# Patient Record
Sex: MALE | Race: WHITE
[De-identification: names, ages, dates, MRNs, and addresses within clinical notes are randomized per-mention and may not be internally consistent; named-entity substitution may affect disease eponyms.]

---

## 2017-01-04 ENCOUNTER — HOSPITAL ENCOUNTER (OUTPATIENT)
Dept: HOSPITAL 80 - BHFA | Age: 72
End: 2017-01-04
Attending: INTERNAL MEDICINE
Payer: COMMERCIAL

## 2017-01-04 DIAGNOSIS — I71.9: Primary | ICD-10-CM

## 2017-06-27 ENCOUNTER — HOSPITAL ENCOUNTER (OUTPATIENT)
Dept: HOSPITAL 80 - FED | Age: 72
Setting detail: OBSERVATION
LOS: 2 days | Discharge: HOME | End: 2017-06-29
Attending: SURGERY | Admitting: SURGERY
Payer: COMMERCIAL

## 2017-06-27 DIAGNOSIS — K21.9: ICD-10-CM

## 2017-06-27 DIAGNOSIS — I48.91: ICD-10-CM

## 2017-06-27 DIAGNOSIS — I77.810: ICD-10-CM

## 2017-06-27 DIAGNOSIS — Z96.653: ICD-10-CM

## 2017-06-27 DIAGNOSIS — K56.60: Primary | ICD-10-CM

## 2017-06-27 DIAGNOSIS — K57.32: ICD-10-CM

## 2017-06-27 DIAGNOSIS — Z96.641: ICD-10-CM

## 2017-06-27 LAB
% IMMATURE GRANULYOCYTES: 0.3 % (ref 0–1.1)
ABSOLUTE IMMATURE GRANULOCYTES: 0.03 10^3/UL (ref 0–0.1)
ABSOLUTE NRBC COUNT: 0 10^3/UL (ref 0–0.01)
ADD DIFF?: NO
ADD MORPH?: NO
ADD SCAN?: NO
APTT BLD: 28.4 SEC (ref 23–38)
ATYPICAL LYMPHOCYTE FLAG: 0 (ref 0–99)
ERYTHROCYTE [DISTWIDTH] IN BLOOD BY AUTOMATED COUNT: 11.9 % (ref 11.5–15.2)
FRAGMENT RBC FLAG: 0 (ref 0–99)
HCT VFR BLD CALC: 46.7 % (ref 40–51)
HGB BLD-MCNC: 17.2 G/DL (ref 13.7–17.5)
INR PPP: 1.17 (ref 0.83–1.16)
LEFT SHIFT FLG: 0 (ref 0–99)
LIPEMIA HEMOLYSIS FLAG: 90 (ref 0–99)
MCH RBC BLDCO QN: 33.3 PG (ref 27.9–34.1)
MCHC RBC AUTO-ENTMCNC: 36.8 G/DL (ref 32.4–36.7)
MCV RBC AUTO: 90.3 FL (ref 81.5–99.8)
NRBC-AUTO%: 0 % (ref 0–0.2)
PLATELET # BLD: 219 10^3/UL (ref 150–400)
PLATELET CLUMPS FLAG: 10 (ref 0–99)
PMV BLD AUTO: 10.5 FL (ref 8.7–11.7)
PROTHROMBIN TIME: 14.9 SEC (ref 12–15)
RBC # BLD AUTO: 5.17 10^6/UL (ref 4.4–6.38)

## 2017-06-27 PROCEDURE — 93005 ELECTROCARDIOGRAM TRACING: CPT

## 2017-06-27 PROCEDURE — 0DN84ZZ RELEASE SMALL INTESTINE, PERCUTANEOUS ENDOSCOPIC APPROACH: ICD-10-PCS | Performed by: SURGERY

## 2017-06-27 PROCEDURE — 74177 CT ABD & PELVIS W/CONTRAST: CPT

## 2017-06-27 PROCEDURE — G0378 HOSPITAL OBSERVATION PER HR: HCPCS

## 2017-06-27 PROCEDURE — 71010: CPT

## 2017-06-27 PROCEDURE — 44180 LAP ENTEROLYSIS: CPT

## 2017-06-27 NOTE — CPEKG
Heart Rate: 69

RR Interval: 870

P-R Interval: 176

QRSD Interval: 96

QT Interval: 420

QTC Interval: 450

P Axis: 45

QRS Axis: -18

T Wave Axis: 37

EKG Severity - OTHERWISE NORMAL ECG -

EKG Impression: SINUS RHYTHM

EKG Impression: BORDERLINE LEFT AXIS DEVIATION

Electronically Signed By: Reji Hall 28-Jun-2017 06:48:04

## 2017-06-27 NOTE — EDPHY
H & P


Stated Complaint: abd pain, urinary retention


HPI/ROS: 





HPI





CHIEF COMPLAINT:  Abdominal pain





HISTORY OF PRESENT ILLNESS:  This patient is 71-year-old male, significant past 

medical history for atrial fib, daily aspirin, metoprolol, presents emergency 

room with abdominal discomfort for approximately 12 hours.  Patient describes 

this fullness in his epigastric region and periumbilical right lower quadrant 

region.  It is associated with nausea.  Denies chest pain shortness of breath.  

He tells me the same thing happened to him 1 time when he got severely 

constipated and bloated.  He tells me did have a bowel movement earlier today.  

Since around 11:00 a.m. he has had this abdominal pain is located epigastric 

periumbilical right lower quadrant right upper quadrant or right side of his 

abdomen.  Associated nausea but no vomiting. Normal bowel movement no black 

tarry stool.  Saw a nurse practitioner at his primary care doctor's office 

today was diagnosed with GERD.  Was given antacid medication without any 

relief.  He denies chest pain or shortness of breath. Denies fever.  States he 

also has been having some trouble urinating.  Denies abdominal surgery.





Past Medical History:  Known thoracic aortic aneurysm, AFib, BPH





Past Surgical History:  No recent surgical history





Social History:  Denies daily use of drugs alcohol tobacco products





Family History:  Noncontributory








ROS   


REVIEW OF SYSTEMS:


A comprehensive 10 point review of systems is otherwise negative aside from 

elements mentioned in the history of present illness.








Exam   


Constitutional appears well nontoxic  triage nursing summary reviewed, vital 

signs reviewed, awake/alert. 


Eyes   normal conjunctivae and sclera, EOMI, PERRLA. 


HENT   normal inspection, atraumatic, moist mucus membranes, no epistaxis, neck 

supple/ no meningismus, no raccoon eyes. 


Respiratory   clear to auscultation bilaterally, normal breath sounds, no 

respiratory distress, no wheezing. 


Cardiovascular   rate normal, regular rhythm, no murmur, no edema, distal 

pulses normal. 


Gastrointestinal   tender palpation mid abdomen periumbilical region epigastric 

region right upper quadrant right lower quadrant no suprapubic distention or 

suprapubic tenderness., no rebound, no guarding, normal bowel sounds, no 

distension, no pulsatile mass. 


Genitourinary   no CVA tenderness. 


Musculoskeletal  no midline vertebral tenderness, full range of motion, no calf 

swelling, no tenderness of extremities, no meningismus, good pulses, 

neurovascularly intact.


Skin   pink, warm, & dry, no rash, skin atraumatic. 


Neurologic   awake, alert and oriented x 3, AAOx3, moves all 4 extremities 

equally, motor intact, sensory intact, CN II-XII intact, normal cerebellar, 

normal vision, normal speech. 


Psychiatric   normal mood/affect. 


Heme/Lymph/Immune   no lymphadenopathy.





Differential diagnosis includes but is not limited to and in no particular order

:  Bowel obstruction, appendicitis, gallbladder disease, diverticulitis, colitis

, enteritis, perforated viscus, gastritis, GERD, esophagitis, urinary tract 

infection, pyelonephritis, kidney stones





Medical Decision Making:  Plan for this patient IV establishment, full cardiac 

monitor, check lactic acid check blood work, check troponin, upright chest x-

ray for free air, CT abdomen pelvis with IV contrast for acute abdominal pain 

in a 71-year-old.  Check urinalysis for





Re-evaluation:








EKG interpretation by me on record in [facility] system.  Impression time of 

EKG 2339, this is sinus rhythm rate of 69, do not appreciate any acute ischemic 

changes specifically there is no ST elevation, ST depression T-wave 

abnormalities or prolonged intervals.  Similar to the  EKG compared to on 4/6/ 2012.





ED x-ray chest one view upright.  Negative for acute cardiopulmonary disease.  

Specifically no free air.





CT scan of the ABD/pelvis.  The results of the study are SBO.  The study was 

read by Dr. Angulo  I viewed the images myself on the PACS system.








0119AM:  I have consulted General surgery Dr. Osman will come and see and 

evaluate the patient.  I have ordered this patient NG tube.  I have updated the 

patient about his CT results.  And plan.  He will need admission.  At this time 

this patient is hemodynamically stable no evidence of sepsis.  Not vomiting.  

Pain well controlled.  Nontoxic appearing.


Source: Patient





- Medical/Surgical History


Hx Asthma: No


Hx Chronic Respiratory Disease: No


Hx Diabetes: No


Hx Cardiac Disease: Yes


Hx Renal Disease: No


Hx Cirrhosis: No


Hx Alcoholism: No


Hx HIV/AIDS: No


Hx Splenectomy or Spleen Trauma: No


Other PMH: aortic aneurysm ,afib, knee replacement, hip replacement, 

diverticulitis





- Social History


Smoking Status: Never smoked


Constitutional: 


 Initial Vital Signs











Temperature (C)  36.6 C   06/27/17 22:57


 


Heart Rate  72   06/27/17 22:57


 


Respiratory Rate  20   06/27/17 22:57


 


Blood Pressure  154/93 H  06/27/17 22:57


 


O2 Sat (%)  98   06/27/17 22:57








 











O2 Delivery Mode               Room Air














Allergies/Adverse Reactions: 


 





Penicillins Allergy (Mild, Verified 06/27/17 22:57)


 DIFFICULTY BREATHING








Home Medications: 














 Medication  Instructions  Recorded


 


Aspirin [Aspirin 325 mg (*)] 325 mg PO DAILY 06/27/17


 


LORazepam [Ativan (*)] 0.5 mg PO HS PRN 06/27/17


 


Levothyroxine [Synthroid 50 mcg 50 mcg PO DAILY06 06/27/17





(*)]  


 


Metoprolol Succinate Xr [Toprol Xl 25 mg PO BID 06/27/17





25 mg (*)]  


 


Herbals/Supplements -Info Only 1 ea PO DAILY 06/28/17


 


Acetaminophen [Tylenol 325mg (*)] 650 mg PO Q4 PRN #0 tab 06/29/17


 


Ibuprofen [Motrin (*)] 600 mg PO Q6H PRN #0 tab 06/29/17














Medical Decision Making





- Data Points


Laboratory Results: 


 Laboratory Results





 06/27/17 23:35 





 06/27/17 23:35 








Medications Given: 


 








Discontinued Medications





Acetaminophen (Tylenol)  650 mg PO Q4 PRN


   PRN Reason: PAIN, MILD/FEVER


   Stop: 12/26/17 00:00


   Last Admin: 06/29/17 00:10 Dose:  650 mg


Aspirin (Aspirin)  325 mg PO DAILY NEO


   Stop: 12/26/17 08:59


   Last Admin: 06/29/17 09:24 Dose:  325 mg


Fentanyl (Sublimaze)  50 mcg IVP EDNOW ONE


   Stop: 06/27/17 23:34


   Last Admin: 06/27/17 23:53 Dose:  50 mcg


Fentanyl (Sublimaze)  50 mcg IVP EDNOW ONE


   Stop: 06/28/17 01:04


   Last Admin: 06/28/17 01:11 Dose:  50 mcg


Hydromorphone HCl (Dilaudid)  0.5 mg IVP EDNOW ONE


   Stop: 06/27/17 23:29


   Last Admin: 06/27/17 23:53 Dose:  Not Given


Sodium Chloride (Ns)  1,000 mls @ 0 mls/hr IV ONCE ONE; Wide Open


   PRN Reason: Protocol


   Stop: 06/27/17 23:29


   Last Admin: 06/27/17 23:50 Dose:  1,000 mls


Sodium Chloride (Ns)  1,000 mls @ 0 mls/hr IV ONCE ONE


   PRN Reason: Wide Open


   Stop: 06/28/17 00:35


   Last Admin: 06/28/17 00:35 Dose:  1,000 mls


Lactated Ringer's (Lr)  1,000 mls @ 100 mls/hr IV CONT NEO


   Stop: 12/25/17 06:59


   Last Admin: 06/28/17 08:39 Dose:  1,000 mls


Levothyroxine Sodium (Synthroid)  50 mcg PO DAILY06 [facility]


   Stop: 12/26/17 05:59


   Last Admin: 06/29/17 05:18 Dose:  Not Given


Lidocaine (Uroject Lidocaine 2% Jelly)  20 ml UR ONCE ONE


   Stop: 06/28/17 01:34


   Last Admin: 06/28/17 02:09 Dose:  20 ml


Lorazepam (Ativan Injection)  0.5 mg IVP EDNOW ONE


   Stop: 06/27/17 23:30


   Last Admin: 06/27/17 23:53 Dose:  0.5 mg


Lorazepam (Ativan Injection)  0.5 mg IVP EDNOW ONE


   Stop: 06/28/17 03:51


   Last Admin: 06/28/17 03:00 Dose:  0.5 mg


Lorazepam (Ativan)  0.5 mg PO HS PRN


   PRN Reason: cramping


   Stop: 12/25/17 16:58


   Last Admin: 06/28/17 22:36 Dose:  0.5 mg


Metoprolol Succinate (Toprol Xl)  25 mg PO BID NEO


   Stop: 12/25/17 20:59


   Last Admin: 06/29/17 09:24 Dose:  25 mg


Morphine Sulfate (Morphine)  2 - 4 mg IVP Q1HR PRN


   PRN Reason: Pain, Severe Unable to Take PO


   Stop: 07/08/17 06:45


   Last Admin: 06/28/17 17:29 Dose:  2 mg


Ondansetron HCl (Zofran)  4 mg IVP EDNOW ONE


   Stop: 06/27/17 23:29


   Last Admin: 06/27/17 23:53 Dose:  4 mg


Ondansetron HCl (Zofran Odt)  4 mg PO EDNOW ONE


   Stop: 06/28/17 03:51


   Last Admin: 06/28/17 03:52 Dose:  4 mg








Departure





- Departure


Disposition: [facility] Inpatient Acute


Clinical Impression: 


 SBO (small bowel obstruction)





Condition: Good

## 2017-06-28 LAB
ALBUMIN SERPL-MCNC: 4.5 G/DL (ref 3.5–5)
ALP SERPL-CCNC: 71 IU/L (ref 38–126)
ALT SERPL-CCNC: 36 IU/L (ref 21–72)
ANION GAP SERPL CALC-SCNC: 13 MEQ/L (ref 8–16)
AST SERPL-CCNC: 31 IU/L (ref 17–59)
BILIRUB SERPL-MCNC: 2.7 MG/DL (ref 0.1–1.4)
BILIRUBIN-CONJUGATED: 0.3 MG/DL (ref 0–0.5)
BILIRUBIN-UNCONJUGATED: 2.4 MG/DL (ref 0–1.1)
CALCIUM SERPL-MCNC: 10.2 MG/DL (ref 8.5–10.4)
CHLORIDE SERPL-SCNC: 98 MEQ/L (ref 97–110)
CO2 SERPL-SCNC: 23 MEQ/L (ref 22–31)
COLOR UR: YELLOW
CREAT SERPL-MCNC: 1.1 MG/DL (ref 0.7–1.3)
GFR SERPL CREATININE-BSD FRML MDRD: > 60 ML/MIN/{1.73_M2}
GLUCOSE SERPL-MCNC: 100 MG/DL (ref 70–100)
NITRITE UR QL STRIP: NEGATIVE
PH UR STRIP: 8 [PH] (ref 5–7.5)
POTASSIUM SERPL-SCNC: 4.2 MEQ/L (ref 3.5–5.2)
PROT SERPL-MCNC: 7 G/DL (ref 6.3–8.2)
SODIUM SERPL-SCNC: 134 MEQ/L (ref 134–144)
SP GR UR STRIP: 1.02 (ref 1–1.03)
TROPONIN I SERPL-MCNC: < 0.012 NG/ML (ref 0–0.03)

## 2017-06-28 RX ADMIN — METOPROLOL SUCCINATE SCH MG: 25 TABLET, EXTENDED RELEASE ORAL at 22:36

## 2017-06-28 RX ADMIN — SODIUM CHLORIDE, SODIUM LACTATE, POTASSIUM CHLORIDE, AND CALCIUM CHLORIDE SCH MLS: 600; 310; 30; 20 INJECTION, SOLUTION INTRAVENOUS at 08:39

## 2017-06-28 RX ADMIN — SODIUM CHLORIDE, SODIUM LACTATE, POTASSIUM CHLORIDE, AND CALCIUM CHLORIDE SCH MLS: 600; 310; 30; 20 INJECTION, SOLUTION INTRAVENOUS at 08:35

## 2017-06-28 NOTE — GOP
[f rep st]



                                                                OPERATIVE REPORT





DATE OF OPERATION:  



SURGEON:  Jameson Osman MD



ANESTHESIA:  General endotracheal anesthesia.



ANESTHESIOLOGIST:  Kishore Diaz MD.



PREOPERATIVE DIAGNOSIS:  Small-bowel obstruction.



POSTOPERATIVE DIAGNOSIS:  Adhesive small bowel obstruction.



PROCEDURE PERFORMED:  Laparoscopic lysis of adhesion/enterolysis.



FINDINGS:  Adhesive small bowel obstruction.



SPECIMENS:  None.



INDICATIONS:  A 71-year-old gentleman presents with acute small bowel obstruction.  CT scan and exam
 consistent with high-grade small bowel obstruction.  Here for urgent repair.



DESCRIPTION OF PROCEDURE:  The patient was brought to the operating room.  After induction of endotr
acheal anesthesia in a supine position, the abdomen was prepped with chlorhexidine and draped steril
ely.  Time-out procedure was then performed according to institutional standards.  Local anesthetic 
is infused in skin and subcutaneous tissues of the putative trocar sites.  After optical trocar plac
ement in the left upper quadrant, the abdomen is insufflated to 15 TOR with carbon dioxide.  Working
 trocars were placed in the left flank, and the abdomen is explored.  There is decompressed bowel di
stally and proximal dilated bowel.  An adhesive band is identified and easily lysed.  There are adhe
sions of the omentum to the anterior abdominal wall, and this is taken down with bipolar energy Liga
Sure.  After getting rid of the adhesion, it is noted to have peristalsis and filling of the distal 
bowel.  The bowel is then run from ligament of Treitz to the ileocecal valve and back to ensure no o
ther areas of obstruction were identified.  No pathology was seen except some mild sigmoid diverticu
litis.  The abdomen was deflated.  Working trocars were removed.  The skin ports were then closed us
ing 4-0 Monocryl.  Dermabond was applied.  The patient was awakened, extubated, and taken to the rec
overy room in stable condition.  No immediate complications.





Job #:  [id number]

## 2017-06-28 NOTE — POSTOPPROG
Post Op Note


Date of Operation: 06/28/17


Surgeon: Jameson Osman


Anesthesiologist: WALDO Diaz


Anesthesia: GET(General Endotracheal), LMA


Pre-op Diagnosis: SBO


Post-op Diagnosis: same


Procedure: Laparoscopic enterolysis


Findings: adhesive band


Inf/Abcess present in the surg proc area at time of surgery?: No


EBL: Minimal


Complications: 





none


Specimen(s): 





none

## 2017-06-28 NOTE — PDGENHP
History and Physical





- Chief Complaint


Abdominal pain





- History of Present Illness


Mr. Mcbride is a 71-year-old gentleman who had acute onset of pain in his 

right lower quadrant approximately 2 days ago.  Over the last 24 hours he has 

felt nauseous bloated.  His pain is more generalized now he has never had 

previous abdominal surgery. He has had a history of diverticulitis and 

gastroesophageal reflux for which he has had esophagogastroduodenoscopy as well 

as dilation.





History Information





- Allergies/Home Medication List


Allergies/Adverse Reactions: 








Penicillins Allergy (Mild, Verified 06/27/17 22:57)


 DIFFICULTY BREATHING





Home Medications: 








Aspirin  06/27/17 [Last Taken Unknown]


Ativan  06/27/17 [Last Taken Unknown]


Metoprolol Tartrate  06/27/17 [Last Taken Unknown]


Synthroid  06/27/17 [Last Taken Unknown]





I have personally reviewed and updated: family history, medical history, social 

history, surgical history





- Past Medical History


atrial fibrillation, GERD


Additional medical history: Thoracic aneurysm





- Surgical History


Additional surgical history: Right total hip arthroplasty, bilateral knee 

arthroplasty, right retinal detachment surgery





- Family History


Additional family history: Not pertinent





- Social History


Smoking Status: Never smoked





Review of Systems


ROS: 10pt was reviewed & negative except for what was stated in HPI & below


Cardiac: Reports: irregular heart rate


Gastrointestinal: Reports: vomitting, abdominal pain, abdominal distention, 

nausea


Muscolosketal: Reports: joint pain, other (Muscle cramps thighs)





Physical Exam














Temp Pulse Resp BP Pulse Ox


 


 36.6 C   86   16   131/92 H  94 


 


 06/27/17 22:57  06/28/17 02:00  06/28/17 02:00  06/28/17 02:00  06/28/17 02:00











Constitutional: no apparent distress


Eyes: anicteric sclera, EOMI


Ears, Nose, Mouth, Throat: moist mucous membranes, hearing normal, No poor 

dentition


Cardiovascular: irregularly irregular, pulses symmetric bilaterally


Peripheral Pulses: 2+: carotid (R), carotid (L), femoral (R), femoral (L), 

dorsalis-pedis (R), dorsalis-pedis (L)


Respiratory: no respiratory distress


Gastrointestinal: no palpable masses, tenderness, distension, No 

hepatosplenomegally, No guarding, No rebound


Genitourinary: no bladder fullness


Skin: warm, No mottled


Musculoskeletal: full muscle strength


Neurologic: AAOx3, CN II-XII Intact


Psychiatric: interacting appropriately, not anxious


Lymph, Heme, Immunologic: No lymphadenopathy





Lab Data & Imaging Review





 06/27/17 23:35





 06/27/17 23:35














WBC  10.73 10^3/uL (3.80-9.50)  H  06/27/17  23:35    


 


RBC  5.17 10^6/uL (4.40-6.38)   06/27/17  23:35    


 


Hgb  17.2 g/dL (13.7-17.5)   06/27/17  23:35    


 


Hct  46.7 % (40.0-51.0)   06/27/17  23:35    


 


MCV  90.3 fL (81.5-99.8)   06/27/17  23:35    


 


MCH  33.3 pg (27.9-34.1)   06/27/17  23:35    


 


MCHC  36.8 g/dL (32.4-36.7)  H  06/27/17  23:35    


 


RDW  11.9 % (11.5-15.2)   06/27/17  23:35    


 


Plt Count  219 10^3/uL (150-400)   06/27/17  23:35    


 


MPV  10.5 fL (8.7-11.7)   06/27/17  23:35    


 


Neut % (Auto)  80.2 % (39.3-74.2)  H  06/27/17  23:35    


 


Lymph % (Auto)  13.3 % (15.0-45.0)  L  06/27/17  23:35    


 


Mono % (Auto)  5.1 % (4.5-13.0)   06/27/17  23:35    


 


Eos % (Auto)  0.7 % (0.6-7.6)   06/27/17  23:35    


 


Baso % (Auto)  0.4 % (0.3-1.7)   06/27/17  23:35    


 


Nucleat RBC Rel Count  0.0 % (0.0-0.2)   06/27/17  23:35    


 


Absolute Neuts (auto)  8.61 10^3/uL (1.70-6.50)  H  06/27/17  23:35    


 


Absolute Lymphs (auto)  1.43 10^3/uL (1.00-3.00)   06/27/17  23:35    


 


Absolute Monos (auto)  0.55 10^3/uL (0.30-0.80)   06/27/17  23:35    


 


Absolute Eos (auto)  0.07 10^3/uL (0.03-0.40)   06/27/17  23:35    


 


Absolute Basos (auto)  0.04 10^3/uL (0.02-0.10)   06/27/17  23:35    


 


Absolute Nucleated RBC  0.00 10^3/uL (0-0.01)   06/27/17  23:35    


 


Immature Gran %  0.3 % (0.0-1.1)   06/27/17  23:35    


 


Immature Gran #  0.03 10^3/uL (0.00-0.10)   06/27/17  23:35    


 


PT  14.9 SEC (12.0-15.0)   06/27/17  23:35    


 


INR  1.17  (0.83-1.16)  H  06/27/17  23:35    


 


APTT  28.4 SEC (23.0-38.0)   06/27/17  23:35    


 


VBG Lactic Acid  1.4 mmol/L (0.7-2.1)   06/27/17  23:35    


 


Sodium  134 mEq/L (134-144)   06/27/17  23:35    


 


Potassium  4.2 mEq/L (3.5-5.2)   06/27/17  23:35    


 


Chloride  98 mEq/L ()   06/27/17  23:35    


 


Carbon Dioxide  23 mEq/l (22-31)   06/27/17  23:35    


 


Anion Gap  13 mEq/L (8-16)   06/27/17  23:35    


 


BUN  19 mg/dL (7-23)   06/27/17  23:35    


 


Creatinine  1.1 mg/dL (0.7-1.3)   06/27/17  23:35    


 


Estimated GFR  > 60   06/27/17  23:35    


 


Glucose  100 mg/dL ()   06/27/17  23:35    


 


Calcium  10.2 mg/dL (8.5-10.4)   06/27/17  23:35    


 


Total Bilirubin  2.7 mg/dL (0.1-1.4)  H  06/27/17  23:35    


 


Conjugated Bilirubin  0.3 mg/dL (0.0-0.5)   06/27/17  23:35    


 


Unconjugated Bilirubin  2.4 mg/dL (0.0-1.1)  H  06/27/17  23:35    


 


AST  31 IU/L (17-59)   06/27/17  23:35    


 


ALT  36 IU/L (21-72)   06/27/17  23:35    


 


Alkaline Phosphatase  71 IU/L ()   06/27/17  23:35    


 


Troponin I  < 0.012 ng/mL (0-0.034)   06/27/17  23:35    


 


Total Protein  7.0 g/dL (6.3-8.2)   06/27/17  23:35    


 


Albumin  4.5 g/dL (3.5-5.0)   06/27/17  23:35    


 


Lipase  105.0 IU/L ()   06/27/17  23:35    


 


Urine Color  YELLOW   06/28/17  00:30    


 


Urine Appearance  CLEAR   06/28/17  00:30    


 


Urine pH  8.0  (5.0-7.5)  H  06/28/17  00:30    


 


Ur Specific Gravity  1.024  (1.002-1.030)   06/28/17  00:30    


 


Urine Protein  NEGATIVE  (NEGATIVE)   06/28/17  00:30    


 


Urine Ketones  2+  (NEGATIVE)  H  06/28/17  00:30    


 


Urine Blood  NEGATIVE  (NEGATIVE)   06/28/17  00:30    


 


Urine Nitrate  NEGATIVE  (NEGATIVE)   06/28/17  00:30    


 


Urine Bilirubin  NEGATIVE  (NEGATIVE)   06/28/17  00:30    


 


Urine Urobilinogen  NEGATIVE EU (0.2-1.0)   06/28/17  00:30    


 


Ur Leukocyte Esterase  NEGATIVE  (NEGATIVE)   06/28/17  00:30    


 


Urine Glucose  NEGATIVE  (NEGATIVE)   06/28/17  00:30    








Imaging Review: 





 Imaging Impressions





Chest X-Ray  06/27/17 23:30


Impression:


1. No active cardiopulmonary disease seen.


2. Tortuous descending thoracic aorta.








CT scan personally reviewed demonstrates a transition point in the right lower 

quadrant consistent with a closed loop obstruction





Assessment & Plan


Assessment: 








SBO (small bowel obstruction) (Acute)


Atrial fibrillation


GERD


Osteoarthritis





Plan: 





Diagnostic laparoscopy possible laparotomy for relief of small-bowel 

obstruction. The risks benefits and alternatives to surgery have been outlined 

clearly with the patient. The risks include but are not limited to bleeding, 

infection, injury to other surrounding structures that could require further 

intervention.  Specifically there is a risk of need of small-bowel resection 

and risk of anastomotic leak.


The patient is not on any anticoagulation although he has atrial fibrillation 

he is rate controlled on metoprolol which he took this morning


Allergies to penicillin.


IV fluid hydration


NG tube placed in the emergency room


No need for Chen catheter


Anticipate 48 hour hospital stay if he has laparoscopy slightly longer for 

laparotomy.


GI prophylaxis


Low risk for DVT.

## 2017-06-28 NOTE — POSTANESTH
Post Anesthetic Evaluation


Cardiovascular Status: Similar to Pre-Op Cond


Respiratory Status: Normal, Stable


Level of Consciousness/Mental Status: Can Participate in Eval, Alert and 

Oriented


Pain Control: Adequate, Prn Tx Ordered


Nausea/Vomiting Control: Adequate, Prn Tx Ordered


Complications Possibly Related to Anesthesia: None Noted

## 2017-06-28 NOTE — PDANEPAE
ANE History of Present Illness





abdominal pain, possible SBO.





ANE Past Medical History





- Cardiovascular History


Hx Arrhythmias: Yes





- Pulmonary History


Hx COPD: No


Hx Asthma/Reactive Airway Disease: No


Hx Oxygen in Use at Home: No





- Endocrine History


Hx Diabetes: No


Hypothyroid: Yes





ANE Patient History





- Allergies


Allergies/Adverse Reactions: 








Penicillins Allergy (Mild, Verified 06/27/17 22:57)


 DIFFICULTY BREATHING








- Home Medications


Home medications: home medication list seen and reviewed


Home Medications: 








Aspirin  06/27/17 [Last Taken Unknown]


Ativan  06/27/17 [Last Taken Unknown]


Metoprolol Tartrate  06/27/17 [Last Taken Unknown]


Synthroid  06/27/17 [Last Taken Unknown]








- NPO status


NPO Since - Liquids (Date): 06/28/17


NPO Since - Liquids (Time): 00:00


NPO Since - Solids (Date): 06/28/17


NPO Since - Solids (Time): 00:00





- Anes Hx


Hx Anesthesia Complications (with details): s/p SAB for R MARILEE with postop. 

itching





- Smoking Hx


Smoking Status: Former smoker


Marijuana use: No





- Alcohol Use


Alcohol Use: None





- Family Anes Hx


Family Anes Hx: none





ANE Labs/Vital Signs





- Labs


Result Diagrams: 


 06/27/17 23:35





 06/27/17 23:35





- Vital Signs


Blood Pressure: 124/95


Heart Rate: 85


Respiratory Rate: 16


O2 Sat (%): 94


Height: 182.88 cm


Weight: 99.79 kg





ANE Physical Exam





- Airway


Neck exam: FROM


Mallampati Score: Class 2


Mouth exam: normal dental/mouth exam





- Pulmonary


Pulmonary: clear to auscultation





- Cardiovascular


Cardiovascular: regular rate and rhythym





- ASA Status


ASA Status: III





ANE Anesthesia Plan


Anesthesia Plan: general endotracheal anesthesia (rapid sequence induction)

## 2017-06-29 VITALS
DIASTOLIC BLOOD PRESSURE: 67 MMHG | TEMPERATURE: 98.2 F | RESPIRATION RATE: 12 BRPM | SYSTOLIC BLOOD PRESSURE: 110 MMHG | HEART RATE: 51 BPM

## 2017-06-29 VITALS — OXYGEN SATURATION: 92 %

## 2017-06-29 RX ADMIN — METOPROLOL SUCCINATE SCH MG: 25 TABLET, EXTENDED RELEASE ORAL at 09:24

## 2017-11-22 ENCOUNTER — HOSPITAL ENCOUNTER (OUTPATIENT)
Dept: HOSPITAL 80 - FIMAGING | Age: 72
End: 2017-11-22
Attending: INTERNAL MEDICINE
Payer: COMMERCIAL

## 2017-11-22 DIAGNOSIS — R91.1: ICD-10-CM

## 2017-11-22 DIAGNOSIS — Z01.810: Primary | ICD-10-CM

## 2017-11-22 DIAGNOSIS — I25.10: ICD-10-CM

## 2017-11-22 DIAGNOSIS — I71.2: ICD-10-CM

## 2017-11-22 PROCEDURE — 71275 CT ANGIOGRAPHY CHEST: CPT

## 2017-12-06 ENCOUNTER — HOSPITAL ENCOUNTER (OUTPATIENT)
Dept: HOSPITAL 80 - FSGY | Age: 72
Discharge: HOME | End: 2017-12-06
Attending: SURGERY
Payer: COMMERCIAL

## 2017-12-06 VITALS — DIASTOLIC BLOOD PRESSURE: 76 MMHG | SYSTOLIC BLOOD PRESSURE: 117 MMHG | HEART RATE: 60 BPM

## 2017-12-06 VITALS — RESPIRATION RATE: 14 BRPM | OXYGEN SATURATION: 96 %

## 2017-12-06 VITALS — TEMPERATURE: 97 F

## 2017-12-06 DIAGNOSIS — K40.90: Primary | ICD-10-CM

## 2017-12-06 DIAGNOSIS — K56.50: ICD-10-CM

## 2017-12-06 PROCEDURE — 0YQ50ZZ REPAIR RIGHT INGUINAL REGION, OPEN APPROACH: ICD-10-PCS | Performed by: SURGERY

## 2017-12-06 PROCEDURE — C1781 MESH (IMPLANTABLE): HCPCS

## 2017-12-06 PROCEDURE — C1727 CATH, BAL TIS DIS, NON-VAS: HCPCS

## 2017-12-06 RX ADMIN — Medication PRN MCG: at 12:56

## 2017-12-06 RX ADMIN — Medication PRN MCG: at 13:03

## 2017-12-06 NOTE — PDANEPAE
ANE Past Medical History





- Cardiovascular History


Hx Hypertension: No


Hx Arrhythmias: Yes


Hx Chest Pain: No


Hx Coronary Artery / Peripheral Vascular Disease: No


Hx CHF / Valvular Disease: No


Hx Palpitations: Yes


Cardiovascular History Comment: afib,aortic aneurysm





- Pulmonary History


Hx COPD: No


Hx Asthma/Reactive Airway Disease: No


Hx Oxygen in Use at Home: No


Hx Sleep Apnea: Yes


Sleep Apnea Screening Result - Last Documented: Positive


Pulmonary History Comment: + TAYLOR, + CPAP





- Neurologic History


Hx Cerebrovascular Accident: No


Hx Seizures: No


Hx Dementia: No





- Endocrine History


Hx Diabetes: No


Hypothyroid: No


Hyperthyroid: No


Obesity: moderate





- Renal History


Hx Renal Disorders: No





- Liver History


Hx Hepatic Disorders: No





- Neurological & Psychiatric Hx


Hx Neurological and Psychiatric Disorders: Yes


Neurological / Psychiatric History Comment: numbness in feet after surgery





- Cancer History


Hx Cancer: No





- Congenital Disorder History


Hx Congenital Disorders: No





- GI History


Hx Gastrointestinal Disorders: Yes


Gastrointestinal History Comment: diverticulitis, SBO





- Other Health History


Other Health History: detached retina repair left,.  cataracts





- Chronic Pain History


Chronic Pain: No





- Surgical History


Prior Surgeries: MARILEE, B TKA,





ANE Review of Systems


Review of Systems: 








- Exercise capacity


METS (RN): 4 METS





ANE Patient History





- Allergies


Allergies/Adverse Reactions: 








Penicillins Allergy (Mild, Verified 06/27/17 22:57)


 DIFFICULTY BREATHING








- Home Medications


Home Medications: 








Aspirin [Aspirin 325 mg (*)] 325 mg PO DAILY 06/27/17 [Last Taken 06/26/17]


LORazepam [Ativan (*)] 0.5 mg PO HS PRN 06/27/17 [Last Taken 06/27/17]


Levothyroxine [Synthroid 50 mcg (*)] 50 mcg PO DAILY06 06/27/17 [Last Taken 06/ 27/17]


Metoprolol Succinate Xr [Toprol Xl 25 mg (*)] 25 mg PO BID 06/27/17 [Last Taken 

06/27/17 09:00]


Herbals/Supplements -Info Only 1 ea PO DAILY 06/28/17 [Last Taken Unknown]








- NPO status


NPO Since - Liquids (Date): 12/06/17


NPO Since - Liquids (Time): 07:00


NPO Since - Solids (Date): 12/05/17


NPO Since - Solids (Time): 22:00





- Smoking Hx


Smoking Status: Never smoked





ANE Labs/Vital Signs





- Vital Signs


Blood Pressure: 138/82


Heart Rate: 60


Respiratory Rate: 18


O2 Sat (%): 97


Height: 182.88 cm


Weight: 102.965 kg





[facility] Physical Exam





- Airway


Neck exam: FROM


Mallampati Score: Class 2


Mouth exam: normal dental/mouth exam





- Pulmonary


Pulmonary: no respiratory distress





- Cardiovascular


Cardiovascular: regular rate and rhythym





- ASA Status


ASA Status: II





[facility] Anesthesia Plan


Anesthesia Plan: general endotracheal anesthesia, GA w LMA

## 2017-12-06 NOTE — POSTANESTH
Post Anesthetic Evaluation


Cardiovascular Status: Similar to Pre-Op Cond


Respiratory Status: Normal, Stable


Level of Consciousness/Mental Status: Can Participate in Eval


Pain Control: Adequate, Prn Tx Ordered


Nausea/Vomiting Control: Adequate, Prn Tx Ordered


Complications Possibly Related to Anesthesia: None Noted

## 2017-12-06 NOTE — GOP
[f rep st]



                                                                OPERATIVE REPORT





DATE OF OPERATION:  12/06/2017



SURGEON:  Jameson Osman MD



ASSISTANT:  None.



ANESTHESIA:  General endotracheal anesthesia.



ANESTHESIOLOGIST:  Jocelyne Sebastian MD



PREOPERATIVE DIAGNOSIS:  Right inguinal hernia.



POSTOPERATIVE DIAGNOSIS:  Right inguinal hernia.



PROCEDURE PERFORMED:  Laparoscopic totally extraperitoneal right inguinal hernia repair with mesh. 



Mesh is 3DMax Bard right anatomic.



FINDINGS:  



SPECIMENS:  None.



INDICATIONS:  A 72-year-old gentleman who presents for elective repair of right inguinal hernia, symp
tomatic.



DESCRIPTION OF PROCEDURE:  The patient was brought in to the operating room.  After induction of endo
tracheal anesthesia, the abdomen was prepped with chlorhexidine and draped sterilely.  A time-out pro
cedure was then performed according to institutional standards.  Local anesthetic was infused in the 
skin and subcutaneous tissue as a field block, as well as the lower midline of trocar sites.  Open pr
eperitoneal trocar placement, balloon dissection, and structural balloon maintenance at 15 torr with 
carbon dioxide were done in the standard fashion.  Working trocars were placed in the lower midline u
nder direct visualization.  The preperitoneal space was dissected from the pubic tubercle to the ante
rior superior iliac spine.  The peritoneum was brought down, lipoma of the cord was reduced, and a sm
all direct inguinal hernia and a small indirect hernia are noted.  The mesh was then unfurled between
 the peritoneum and the hernia defect, and the space allowed to collapse.  Hemostasis was assured.  N
eedle, instrument and sponge counts were correct x2.  No immediate complications.  The patient was aw
akened, extubated, taken to the recovery room in stable condition.



COMPLICATIONS:  None.





Job #:  [id number]

## 2017-12-06 NOTE — POSTOPPROG
Post Op Note


Date of Operation: 12/06/17


Surgeon: Jameson Osman


Assistant: none


Anesthesiologist: Jaz


Anesthesia: GET(General Endotracheal)


Pre-op Diagnosis: RIH


Post-op Diagnosis: same


Procedure: Lap TEP RIH repair with 3D max mesh


Findings: direct hernia


Inf/Abcess present in the surg proc area at time of surgery?: No


Depth: Organ Space


EBL: Minimal


Complications: 





none


Specimen(s): 





none

## 2019-02-01 ENCOUNTER — HOSPITAL ENCOUNTER (OUTPATIENT)
Dept: HOSPITAL 80 - BHFA | Age: 74
End: 2019-02-01
Attending: INTERNAL MEDICINE
Payer: COMMERCIAL

## 2019-02-01 DIAGNOSIS — I71.9: Primary | ICD-10-CM

## 2019-02-13 ENCOUNTER — HOSPITAL ENCOUNTER (OUTPATIENT)
Dept: HOSPITAL 80 - BHFA | Age: 74
End: 2019-02-13
Attending: INTERNAL MEDICINE
Payer: COMMERCIAL

## 2019-02-13 DIAGNOSIS — I10: ICD-10-CM

## 2019-02-13 DIAGNOSIS — R07.9: Primary | ICD-10-CM

## 2019-02-13 DIAGNOSIS — I72.9: ICD-10-CM

## 2019-02-13 PROCEDURE — 93017 CV STRESS TEST TRACING ONLY: CPT

## 2019-02-13 PROCEDURE — A9500 TC99M SESTAMIBI: HCPCS

## 2019-02-13 PROCEDURE — 78452 HT MUSCLE IMAGE SPECT MULT: CPT
